# Patient Record
Sex: FEMALE | Race: OTHER | NOT HISPANIC OR LATINO | URBAN - METROPOLITAN AREA
[De-identification: names, ages, dates, MRNs, and addresses within clinical notes are randomized per-mention and may not be internally consistent; named-entity substitution may affect disease eponyms.]

---

## 2017-03-13 ENCOUNTER — EMERGENCY (EMERGENCY)
Facility: HOSPITAL | Age: 22
LOS: 1 days | Discharge: HOME | End: 2017-03-13
Attending: EMERGENCY MEDICINE
Payer: MEDICAID

## 2017-03-13 VITALS
TEMPERATURE: 99 F | OXYGEN SATURATION: 100 % | SYSTOLIC BLOOD PRESSURE: 123 MMHG | HEART RATE: 100 BPM | RESPIRATION RATE: 20 BRPM | DIASTOLIC BLOOD PRESSURE: 64 MMHG

## 2017-03-13 DIAGNOSIS — N39.0 URINARY TRACT INFECTION, SITE NOT SPECIFIED: ICD-10-CM

## 2017-03-13 DIAGNOSIS — R45.851 SUICIDAL IDEATIONS: ICD-10-CM

## 2017-03-13 DIAGNOSIS — F31.9 BIPOLAR DISORDER, UNSPECIFIED: ICD-10-CM

## 2017-03-13 DIAGNOSIS — F32.89 OTHER SPECIFIED DEPRESSIVE EPISODES: ICD-10-CM

## 2017-03-13 LAB
ALBUMIN SERPL ELPH-MCNC: 4 G/DL — SIGNIFICANT CHANGE UP (ref 3.5–5)
ALP SERPL-CCNC: 73 U/L — SIGNIFICANT CHANGE UP (ref 40–120)
ALT FLD-CCNC: 23 U/L DA — SIGNIFICANT CHANGE UP (ref 10–60)
AMPHET UR-MCNC: NEGATIVE — SIGNIFICANT CHANGE UP
ANION GAP SERPL CALC-SCNC: 9 MMOL/L — SIGNIFICANT CHANGE UP (ref 5–17)
ANISOCYTOSIS BLD QL: SLIGHT — SIGNIFICANT CHANGE UP
APAP SERPL-MCNC: <2 UG/ML — LOW (ref 10–30)
APPEARANCE UR: ABNORMAL
AST SERPL-CCNC: 18 U/L — SIGNIFICANT CHANGE UP (ref 10–40)
BACTERIA # UR AUTO: ABNORMAL /HPF
BARBITURATES UR SCN-MCNC: NEGATIVE — SIGNIFICANT CHANGE UP
BASOPHILS # BLD AUTO: 0.1 K/UL — SIGNIFICANT CHANGE UP (ref 0–0.2)
BASOPHILS NFR BLD AUTO: 1.8 % — SIGNIFICANT CHANGE UP (ref 0–2)
BENZODIAZ UR-MCNC: NEGATIVE — SIGNIFICANT CHANGE UP
BILIRUB SERPL-MCNC: 0.2 MG/DL — SIGNIFICANT CHANGE UP (ref 0.2–1.2)
BILIRUB UR-MCNC: NEGATIVE — SIGNIFICANT CHANGE UP
BUN SERPL-MCNC: 12 MG/DL — SIGNIFICANT CHANGE UP (ref 7–18)
CALCIUM SERPL-MCNC: 9 MG/DL — SIGNIFICANT CHANGE UP (ref 8.4–10.5)
CHLORIDE SERPL-SCNC: 103 MMOL/L — SIGNIFICANT CHANGE UP (ref 96–108)
CO2 SERPL-SCNC: 25 MMOL/L — SIGNIFICANT CHANGE UP (ref 22–31)
COCAINE METAB.OTHER UR-MCNC: NEGATIVE — SIGNIFICANT CHANGE UP
COLOR SPEC: YELLOW — SIGNIFICANT CHANGE UP
CREAT SERPL-MCNC: 0.78 MG/DL — SIGNIFICANT CHANGE UP (ref 0.5–1.3)
DIFF PNL FLD: ABNORMAL
EOSINOPHIL # BLD AUTO: 0 K/UL — SIGNIFICANT CHANGE UP (ref 0–0.5)
EOSINOPHIL NFR BLD AUTO: 0.4 % — SIGNIFICANT CHANGE UP (ref 0–6)
EPI CELLS # UR: ABNORMAL (ref 0–10)
ETHANOL SERPL-MCNC: <3 MG/DL — SIGNIFICANT CHANGE UP (ref 0–10)
GLUCOSE SERPL-MCNC: 83 MG/DL — SIGNIFICANT CHANGE UP (ref 70–99)
GLUCOSE UR QL: NEGATIVE — SIGNIFICANT CHANGE UP
HCG SERPL-ACNC: <1 MIU/ML — SIGNIFICANT CHANGE UP
HCG UR QL: NEGATIVE — SIGNIFICANT CHANGE UP
HCT VFR BLD CALC: 42.8 % — SIGNIFICANT CHANGE UP (ref 34.5–45)
HGB BLD-MCNC: 13.3 G/DL — SIGNIFICANT CHANGE UP (ref 11.5–15.5)
KETONES UR-MCNC: NEGATIVE — SIGNIFICANT CHANGE UP
LEUKOCYTE ESTERASE UR-ACNC: ABNORMAL
LYMPHOCYTES # BLD AUTO: 1.5 K/UL — SIGNIFICANT CHANGE UP (ref 1–3.3)
LYMPHOCYTES # BLD AUTO: 20.9 % — SIGNIFICANT CHANGE UP (ref 13–44)
MCHC RBC-ENTMCNC: 22.7 PG — LOW (ref 27–34)
MCHC RBC-ENTMCNC: 31.2 GM/DL — LOW (ref 32–36)
MCV RBC AUTO: 72.8 FL — LOW (ref 80–100)
METHADONE UR-MCNC: NEGATIVE — SIGNIFICANT CHANGE UP
MICROCYTES BLD QL: SLIGHT — SIGNIFICANT CHANGE UP
MONOCYTES # BLD AUTO: 0.9 K/UL — SIGNIFICANT CHANGE UP (ref 0–0.9)
MONOCYTES NFR BLD AUTO: 12.6 % — SIGNIFICANT CHANGE UP (ref 2–14)
NEUTROPHILS # BLD AUTO: 4.7 K/UL — SIGNIFICANT CHANGE UP (ref 1.8–7.4)
NEUTROPHILS NFR BLD AUTO: 64.3 % — SIGNIFICANT CHANGE UP (ref 43–77)
NITRITE UR-MCNC: NEGATIVE — SIGNIFICANT CHANGE UP
OPIATES UR-MCNC: NEGATIVE — SIGNIFICANT CHANGE UP
PCP SPEC-MCNC: SIGNIFICANT CHANGE UP
PCP UR-MCNC: NEGATIVE — SIGNIFICANT CHANGE UP
PH UR: 7 — SIGNIFICANT CHANGE UP (ref 4.8–8)
PLAT MORPH BLD: NORMAL — SIGNIFICANT CHANGE UP
PLATELET # BLD AUTO: 324 K/UL — SIGNIFICANT CHANGE UP (ref 150–400)
POTASSIUM SERPL-MCNC: 3.5 MMOL/L — SIGNIFICANT CHANGE UP (ref 3.5–5.3)
POTASSIUM SERPL-SCNC: 3.5 MMOL/L — SIGNIFICANT CHANGE UP (ref 3.5–5.3)
PROT SERPL-MCNC: 7.8 G/DL — SIGNIFICANT CHANGE UP (ref 6–8.3)
PROT UR-MCNC: NEGATIVE — SIGNIFICANT CHANGE UP
RBC # BLD: 5.88 M/UL — HIGH (ref 3.8–5.2)
RBC # FLD: 14.6 % — HIGH (ref 10.3–14.5)
RBC BLD AUTO: NORMAL — SIGNIFICANT CHANGE UP
RBC CASTS # UR COMP ASSIST: SIGNIFICANT CHANGE UP /HPF (ref 0–2)
SALICYLATES SERPL-MCNC: <1.7 MG/DL — LOW (ref 2.8–20)
SODIUM SERPL-SCNC: 137 MMOL/L — SIGNIFICANT CHANGE UP (ref 135–145)
SP GR SPEC: 1.01 — SIGNIFICANT CHANGE UP (ref 1.01–1.02)
THC UR QL: NEGATIVE — SIGNIFICANT CHANGE UP
UROBILINOGEN FLD QL: NEGATIVE — SIGNIFICANT CHANGE UP
WBC # BLD: 7.4 K/UL — SIGNIFICANT CHANGE UP (ref 3.8–10.5)
WBC # FLD AUTO: 7.4 K/UL — SIGNIFICANT CHANGE UP (ref 3.8–10.5)
WBC UR QL: ABNORMAL /HPF (ref 0–5)

## 2017-03-13 PROCEDURE — 99285 EMERGENCY DEPT VISIT HI MDM: CPT | Mod: 25

## 2017-03-13 PROCEDURE — 90792 PSYCH DIAG EVAL W/MED SRVCS: CPT

## 2017-03-13 NOTE — ED ADULT TRIAGE NOTE - CHIEF COMPLAINT QUOTE
Brought in by  stating pt has been crying non-stop, pt w/ h/o bipolar/depression crying and telling  to take her to the hospital, pt placed immediately on 1:1 observation

## 2017-03-13 NOTE — ED PROVIDER NOTE - PROGRESS NOTE DETAILS
telepsych consulted. pt c slight fever and +UTI.  will treat c po antibiotic. otherwise medically cleared for psych eval/ admission telepsych evaluated pt. feels pt is safe for dc now and pt has f/u w her own psych Dr MIRELES.  Pt appears well. will dc c martha

## 2017-03-13 NOTE — ED PROVIDER NOTE - CARE PLAN
Principal Discharge DX:	Depression, unspecified depression type  Secondary Diagnosis:	Urinary tract infection without hematuria, site unspecified

## 2017-03-13 NOTE — ED PROVIDER NOTE - NS ED MD SCRIBE ATTENDING SCRIBE SECTIONS
REVIEW OF SYSTEMS/PAST MEDICAL/SURGICAL/SOCIAL HISTORY/HIV/DISPOSITION/HISTORY OF PRESENT ILLNESS/PHYSICAL EXAM/VITAL SIGNS( Pullset)

## 2017-03-13 NOTE — ED ADULT NURSE NOTE - OBJECTIVE STATEMENT
pt from home c/o of feeling depressed and unable to stop crying pt is alert oriented x3 states "I feel depressed and wants to cry" denies any suicidal ideation hx of bipolar on medications pt is currently calm at this time

## 2017-03-13 NOTE — ED PROVIDER NOTE - MEDICAL DECISION MAKING DETAILS
20 y/o F pt presents to the ED for depression with suicidal thoughts today. Will check labs and urine for medical clearance. Psych consult.

## 2017-03-13 NOTE — ED PROVIDER NOTE - OBJECTIVE STATEMENT
20 y/o F pt w/ PMHx of bipolar disorder is brought in by sister and filucho presenting to the ED for depression today. Pt states she has been depressed for one month. Pt notes having suicidal thoughts of taking pills or jumping in front of a subway. Pt also says she has not been sleeping well. Pt's hx of bipolar depression began one year ago and she has been taking Clonazepam and Latuda. Pt denies alcohol/drug use, or any other complaints. NKDA.

## 2017-03-14 VITALS
RESPIRATION RATE: 20 BRPM | DIASTOLIC BLOOD PRESSURE: 67 MMHG | OXYGEN SATURATION: 100 % | TEMPERATURE: 99 F | SYSTOLIC BLOOD PRESSURE: 123 MMHG | HEART RATE: 93 BPM

## 2017-03-14 DIAGNOSIS — F60.3 BORDERLINE PERSONALITY DISORDER: ICD-10-CM

## 2017-03-14 DIAGNOSIS — F32.9 MAJOR DEPRESSIVE DISORDER, SINGLE EPISODE, UNSPECIFIED: ICD-10-CM

## 2017-03-14 LAB — TSH SERPL-MCNC: 1.05 UU/ML — SIGNIFICANT CHANGE UP (ref 0.34–4.82)

## 2017-03-14 PROCEDURE — 80053 COMPREHEN METABOLIC PANEL: CPT

## 2017-03-14 PROCEDURE — 99283 EMERGENCY DEPT VISIT LOW MDM: CPT

## 2017-03-14 PROCEDURE — 81001 URINALYSIS AUTO W/SCOPE: CPT

## 2017-03-14 PROCEDURE — 80307 DRUG TEST PRSMV CHEM ANLYZR: CPT

## 2017-03-14 PROCEDURE — 84443 ASSAY THYROID STIM HORMONE: CPT

## 2017-03-14 PROCEDURE — 85027 COMPLETE CBC AUTOMATED: CPT

## 2017-03-14 PROCEDURE — 93005 ELECTROCARDIOGRAM TRACING: CPT

## 2017-03-14 PROCEDURE — 84702 CHORIONIC GONADOTROPIN TEST: CPT

## 2017-03-14 PROCEDURE — 81025 URINE PREGNANCY TEST: CPT

## 2017-03-14 RX ORDER — CEFUROXIME AXETIL 250 MG
500 TABLET ORAL ONCE
Qty: 0 | Refills: 0 | Status: COMPLETED | OUTPATIENT
Start: 2017-03-14 | End: 2017-03-14

## 2017-03-14 RX ORDER — ACETAMINOPHEN 500 MG
975 TABLET ORAL ONCE
Qty: 0 | Refills: 0 | Status: COMPLETED | OUTPATIENT
Start: 2017-03-14 | End: 2017-03-14

## 2017-03-14 RX ORDER — CEFOXITIN 1 G/1
1 INJECTION, POWDER, FOR SOLUTION INTRAVENOUS
Qty: 14 | Refills: 0 | OUTPATIENT
Start: 2017-03-14 | End: 2017-03-21

## 2017-03-14 RX ADMIN — Medication 975 MILLIGRAM(S): at 00:43

## 2017-03-14 RX ADMIN — Medication 500 MILLIGRAM(S): at 00:43

## 2017-03-14 NOTE — ED ADULT NURSE REASSESSMENT NOTE - NS ED NURSE REASSESS COMMENT FT1
Patient remains hemodynamically stable, calm , cooperative and with no verbal complaints. Denies suicidal and homicidal ideations. Family member by bedside. Medicated as per order. Patient to continue to be monitored. Constant observation still active. Pending disposition.

## 2017-03-14 NOTE — ED BEHAVIORAL HEALTH ASSESSMENT NOTE - REFERRAL / APPOINTMENT DETAILS
scheduled to see Dr ALLEN" on Saturday 3/18/17; recommended Patient call him tomorrow and discuss today's ED presentation and that she felt better on the higher dose of the Latuda; patient is also schedule to see her therapist Prakash 2x/week

## 2017-03-14 NOTE — ED BEHAVIORAL HEALTH ASSESSMENT NOTE - OTHER
sister mckenna and 3 yo daughter names Sydnee engaged suspected Borderline traits; hx of chronic suicidal ideations 'I have been depressed" does not looks sad or depressed; looks upbeat and in a positive mood

## 2017-03-14 NOTE — ED BEHAVIORAL HEALTH ASSESSMENT NOTE - RISK ASSESSMENT
Chronic risk factors: suspected Borderline traits; chronic suicidal ideation with gestures and self-aborted attempts. Protective factors: young; healthy; medication and treatment compliant; no history of psychiatric hospitalizations, no hx of actual suicide attempts; no hx of self-injurious behavior; no hx of aggression/violence; no legal issues; no hx of substance use; denies access to weapons; motivated for help; articulate; strong family support; access to health services. No acute risk factors identified: patient has chronic suicidal ideation/thoughts that come and go even with no triggers. She is able to clearly and concisely verbalize an safety plan (anita 911; go to nearest ED; call [address]), scheduled to attend 2x/week therapy instead of weekly as she finds it helpful; also able to contract for safety

## 2017-03-14 NOTE — ED BEHAVIORAL HEALTH NOTE - BEHAVIORAL HEALTH NOTE
TELEPSYCHIATRY ENCOUNTER  ______________________________________________	  I have visualized that the patient is on an arms-length 1:1  I have visualized that patient is in a private space  I have confirmed with the patient that they understand and agree to the evaluation being performed via Telepsychiatry  I have discussed the above with Telepsychiatry Attending: Dr. Traore  Collateral Contact: Regine scott  -NUMBER: [phone]    -RELATIONSHIP: Sister, they speak multiple times a day    -RELIABILITY: Knowledgeable about patient’s history and presenting issues/concerns.    -OPINION RE PATIENT RELIABILITY: no concerns shared    -OPINION RE CONCERN FOR DANGEROUSNESS: No concerns shared  -AFTERCARE ROLE:  family will provide ongoing support and bring patient home when cleared  -PSYCHOEDUCATION: Reviewed role of Emergency department, nature of involuntary vs. voluntary hospitalization, support groups for caregivers.    CORE HISTORY PROVIDED BY: COLLATERAL, Chart  -DEMOGRAPHICS: Patient is a 21 year old female that resides with her fiancé, couples 2 year old daughter and her fiancé. Patient is a student at [employer] and is also working part time as a  and hopes to someday go to Law School. Patient has [employer] Medicaid.    -DEPENDENTS: 2 year old daughter, no prior CPS involvement, currently being watched by her paternal grandmother while patient is in the ED.    -HPI/PAST PSYCH:  Sister reported that patient was dx with bipolar about a year ago, has been on medications however reported to the sister she recently stopped. Sister shared that patient is in treatment with a psychiatrist, recently changed visit to x2 week due to increased depression. Patient’s last visit was Friday; sister was unable to provide name or agency of provider. Sister shared that patient has not been hospitalized in a psych unit. Sister shared that over the last month she has noticed that patient has been more depressed and crying a lot. Sister reported that patient has shared with her that she thinks of suicide as she doesn’t want to feel depressed anymore, reported to sister different ways she thought of killing herself but she reported to her sister that she doesn’t act on her thoughts. As per sister, Patient shared that a few months ago she thought of jumping in front of a train and previous to that she thought of overdosing on pills. Sister also reported that she often reaches out to her when she is depressed and crying and they talk about positive things in her life, including her daughter.   -SUICIDALITY: Has had suicidal thoughts, denies attempts or self-injurious behavior    -VIOLENCE: none reported    -ARRESTS: none reported    -SUBSTANCE: none reported     -MEDICAL: none reported    -MEDICATIONS: see chart    -TODAY’S ED VISIT: as per chart/ 20 y/o F hx of bipolar disorder is brought in by sister and martha presenting to the ED for depression today. Pt states she has been depressed for one month. Pt notes having suicidal thoughts of taking pills or jumping in front of a subway. Pt also says she has not been sleeping well. Pt's hx of bipolar depression began one year ago and she has been taking Clonazepam and Latuda. Pt denies alcohol/drug use, or any other complaints. NKDA.        -FAMILY HISTORY: none reported  -SOCIAL HISTORY: No history of being a victim/witness/perpetrator. No weapons in the home.     -DISPOSITION: treat and release  Follow up with outpatient provider    I have discussed the above information with Telepsychiatry Attending Dr. Traore

## 2017-03-14 NOTE — ED BEHAVIORAL HEALTH ASSESSMENT NOTE - PAST PSYCHOTROPIC MEDICATION
sertraline (took only for a few months; did not like it as she felt it increased her suicidal ideations)

## 2017-03-14 NOTE — ED BEHAVIORAL HEALTH ASSESSMENT NOTE - DETAILS
staying with her father at this time see HPI. by the time of assessment, Patient denies active/passive suicidality increased suicidal ideations on sertraline self-referred

## 2017-03-14 NOTE — ED BEHAVIORAL HEALTH ASSESSMENT NOTE - SUICIDE PROTECTIVE FACTORS
Identifies reasons for living/Future oriented/Positive therapeutic relationships/Supportive social network or family/Responsibility to family and others

## 2017-03-14 NOTE — ED BEHAVIORAL HEALTH ASSESSMENT NOTE - OTHER PAST PSYCHIATRIC HISTORY (INCLUDE DETAILS REGARDING ONSET, COURSE OF ILLNESS, INPATIENT/OUTPATIENT TREATMENT)
- diagnosed with Bipolar Disorder 1.5 years ago at [facility] by a previous psychiatrist who she did not like and changed (because she was not listening to Patient's complaints of side effects from the medications as per patient). Years of chronic suicidal ideation / gestures with self-aborting (ie. holding pill bottle thinking of overdosing then putting it away; thinking about slitting wrist with a  knife but cannot bring herself to do it out of fear of pain; thought of jumping in front of a train but never making substantial step towards it like walking to the edge of the platform); denies ever having an actual suicide attempt, denies self-injurious behaviors; denies hx of aggression/violence/legal issues/substance use

## 2017-03-14 NOTE — ED BEHAVIORAL HEALTH ASSESSMENT NOTE - DESCRIPTION
calm, cooperative, pleasant and friendly. When telepsych monitor turned on, patient was observed to be laughing out loud and actively socializing with ED staff in the room and looked relaxed and in an upbeat mood. none; hx of  of a fullterm female  3 years prior works as a  and is completing her Associate's degree; her daughter attends

## 2017-03-14 NOTE — ED BEHAVIORAL HEALTH ASSESSMENT NOTE - NS ED BHA PLAN TR BH CONTACTED FT
unable to leave a message at [facility] as the line is not a secure private line but a general welcome message

## 2017-03-14 NOTE — ED BEHAVIORAL HEALTH ASSESSMENT NOTE - SUMMARY
Patient is a 20 yo female with reported Bipolar Disorder diagnosis which seems more like Borderline Personality pathology with chronic suicidal ideation that come/go, with hx of self-aborted attempts/gestures but with no history of actual attempts, with no previous psychiatric hospitalizations, Patient is a 22 yo female with reported Bipolar Disorder diagnosis which seems more like Borderline Personality pathology with chronic suicidal ideation that come/go, impaired sense of self with prone to be easily discouraged/upset, with hx of self-aborted attempts/gestures but with no history of actual attempts, with no previous psychiatric hospitalizations, no hx of aggression/violence/legal issues/substance use who is attending outpatient treatment (both medication management and therapy), she is medication compliant and denies any active suicidal ideation/intent/plan at the time of assessment stating that spending time in the ED away from home/caretaking, etc gave her much needed "time to herself" and she feels better. She declined voluntary admission, was able to verbalize a concise safety plan including calling the national suicide hotline which she reports to use fairly regularly and likes talking to the people there. Patient continues to function in her life, does not use substances, collateral supports her endorsement of things and there is no basis to involuntarily admit her. patient can have her Latuda increased as an outpatient and even as early as tomorrow as she talks with Dr MIRELES and she is already set to have increased therapy sessions. Discharge.

## 2017-03-14 NOTE — ED BEHAVIORAL HEALTH ASSESSMENT NOTE - HPI (INCLUDE ILLNESS QUALITY, SEVERITY, DURATION, TIMING, CONTEXT, MODIFYING FACTORS, ASSOCIATED SIGNS AND SYMPTOMS)
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; daytime in ), 22yo  American female, domiciled with mckenna and daughter in a private residence, who was diagnosed with Bipolar Disorder 1.5 years ago (suspecting it is more along Borderline Personality arena), who is currently seeing a "Dr MIRELES" at [facility] ([address], [place] [address]), last seen last month and scheduled for follow up Saturday 3/18/17, as well as seeing a therapist Prakash (last seen this past Friday and was told to come 2x /week instead of 1x/week) also at [facility], with no hx of inpatient psychiatric admissions, with years of suicidal ideation / gestures with self-aborting (ie. holding pill bottle thinking of overdosing then putting it away; thinking about slitting wrist with a  knife but cannot bring herself to do it out of fear of pain; thought of jumping in front of a train but never making substantial step towards it like walking to the edge of the platform), who denies ever having an actual suicide attempt, denies self-injurious behaviors; denies hx of aggression/violence/legal issues/substance use who self-presented to the ED today, accompanied by mckenna and sister, endorsing nonstop crying, depression and suicidal ideation.     Patient reports that she has done well on higher doses of the Latuda but was currently radha  lower dose as she had to restart it because she was unable to pay for it and then was able to pay for it. She used to take Klonopin 0.5mg PO qd standing but now manages to use it only as needed. patient states that she has been more down in the last month and she attributes it that it could be due to lowered dose of the Latuda. 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, with no hx of inpatient psychiatric admissions, with years of suicidal ideation / gestures with self-aborting, who denies ever having an actual suicide attempt, denies self-injurious behaviors; denies hx of aggression/violence/legal issues/substance use who self-presented to the ED today, accompanied by mckenna and sister, endorsing nonstop crying, depression and suicidal ideation.     Patient is unable to identify/name a specific trigger as to what prompted today's ED visit. She said she felt worst "out of no where" at home (though sounded like her fiancee might have said/done something). Patient reports that she has done well on higher doses of the Latuda but was currently radha  lower dose as she had to restart it because she was unable to pay for it and then was able to pay for it. She used to take Klonopin 0.5mg PO qd standing but now manages to use it only as needed. patient states that she has been more down in the last month and she attributes it that it could be due to lowered dose of the Latuda. Patient describes what sounds like years of chronic suicidal thoughts where she has urges for suicide, she makes a gesture and then self-aborts. (ie. holding pill bottle thinking of overdosing then putting it away; thinking about slitting wrist with a  knife but cannot bring herself to do it out of fear of pain; thought of jumping in front of a train but never making substantial step towards it like walking to the edge of the platform). patient states that she is essentially scared of pain and also names her daughter, her career as protective factors. She feels at times guilty that she has these thoughts because 'I have a good life and have things to be grateful for." Patient feels like she gets a lot out of her therapy session. She saw her therapist Friday, told her how she has been feeling and she recommended increasing her therapy sessions to 2x/week which Patient is looking forward to.     Endorses some changes to her sleep and energy level since on the lower dose of Latuda (at times not falling asleep until 2-3am). She does not feel like getting up in the morning at times and her fiancee pulls her out, but she continues to do daily routine such as taking care of her ADLs, taking care of her daughter and going to work as well as to her outpatient appointment. Reports unchanged, regular appetite / concentration. Denies any symptoms of hypomania/rebecca/psychosis/ anxiety/panic. At the time of assessment, Patient reported that she felt better, states that she probably needed some 'time away from everything" (ie. described as she does not get any time to herself or opportunity to be alone as she is working and then taking care of her daughter at home) even in the ED helped her. She seemed to have enjoyed interacting with ED staff and socializing. She stated that she no longer thought about suicide, did not want to hurt herself, felt her mood is "a little better." Denies any current active or passive suicidal or homicidal ideation. Names protective factors (daughter; family; her career/degree; hope for future). Endorses medication compliance. Denies adverse medication side effects. Denies substance use; denies access to weapons.     Patient was offered voluntary admission which she declined stating that "I am not that bad...I don't feel I am there on that level."    COLLATERAL FROM SISTER SHERI; please see separate  note

## 2019-02-18 NOTE — ED PROVIDER NOTE - RESPIRATORY, MLM
range of motion is not limited and there is no muscle tenderness. Breath sounds clear and equal bilaterally.

## 2021-05-10 NOTE — ED PROVIDER NOTE - DATA REVIEWED, MDM
Mastitis, right breast (bacterial infection)--significantly improved    --Take cephalexin for 7 full days. If there is no firmness, pain/tenderness to touch, or redness remaining in the area of the infection after 7 full days, stop the antibiotic.  --If any abnormality remains after 7 days, then complete the full 10 day course      
vital signs/nurses notes

## 2022-01-14 NOTE — ED ADULT NURSE NOTE - CHIEF COMPLAINT
Asymptomatic covid-19 infection  - will attempt to arrange infusion inpatient  - isolation precautions  - no hypoxia, no indication for dex/rem     The patient is a 21y Female complaining of depression.

## 2023-01-10 NOTE — ED ADULT NURSE NOTE - NS ED NURSE LEVEL OF CONSCIOUSNESS AFFECT
Quality 110: Preventive Care And Screening: Influenza Immunization: Influenza immunization was not ordered or administered, reason not given
Quality 226: Preventive Care And Screening: Tobacco Use: Screening And Cessation Intervention: Tobacco Screening not Performed for Medical Reasons
Detail Level: Detailed
Quality 431: Preventive Care And Screening: Unhealthy Alcohol Use - Screening: Patient not identified as an unhealthy alcohol user when screened for unhealthy alcohol use using a systematic screening method
Calm